# Patient Record
Sex: FEMALE | Race: WHITE | NOT HISPANIC OR LATINO | URBAN - METROPOLITAN AREA
[De-identification: names, ages, dates, MRNs, and addresses within clinical notes are randomized per-mention and may not be internally consistent; named-entity substitution may affect disease eponyms.]

---

## 2018-05-26 PROBLEM — Z00.00 ENCOUNTER FOR PREVENTIVE HEALTH EXAMINATION: Status: ACTIVE | Noted: 2018-05-26

## 2018-06-25 ENCOUNTER — OUTPATIENT (OUTPATIENT)
Dept: OUTPATIENT SERVICES | Facility: HOSPITAL | Age: 58
LOS: 1 days | Discharge: HOME | End: 2018-06-25

## 2018-06-25 ENCOUNTER — APPOINTMENT (OUTPATIENT)
Dept: PODIATRY | Facility: CLINIC | Age: 58
End: 2018-06-25
Payer: MEDICAID

## 2018-06-25 VITALS
WEIGHT: 186 LBS | BODY MASS INDEX: 28.19 KG/M2 | HEART RATE: 69 BPM | HEIGHT: 68 IN | SYSTOLIC BLOOD PRESSURE: 112 MMHG | DIASTOLIC BLOOD PRESSURE: 72 MMHG

## 2018-06-25 DIAGNOSIS — F17.210 NICOTINE DEPENDENCE, CIGARETTES, UNCOMPLICATED: ICD-10-CM

## 2018-06-25 DIAGNOSIS — K57.30 DIVERTICULOSIS OF LARGE INTESTINE W/OUT PERFORATION OR ABSCESS W/OUT BLEEDING: ICD-10-CM

## 2018-06-25 DIAGNOSIS — Z87.19 PERSONAL HISTORY OF OTHER DISEASES OF THE DIGESTIVE SYSTEM: ICD-10-CM

## 2018-06-25 PROCEDURE — 99203 OFFICE O/P NEW LOW 30 MIN: CPT | Mod: NC

## 2018-06-28 DIAGNOSIS — M20.41 OTHER HAMMER TOE(S) (ACQUIRED), RIGHT FOOT: ICD-10-CM

## 2018-06-28 DIAGNOSIS — Q82.9 CONGENITAL MALFORMATION OF SKIN, UNSPECIFIED: ICD-10-CM

## 2018-06-28 DIAGNOSIS — M21.612 BUNION OF LEFT FOOT: ICD-10-CM

## 2018-06-28 DIAGNOSIS — M20.42 OTHER HAMMER TOE(S) (ACQUIRED), LEFT FOOT: ICD-10-CM

## 2018-06-28 DIAGNOSIS — M21.611 BUNION OF RIGHT FOOT: ICD-10-CM

## 2018-09-18 ENCOUNTER — OUTPATIENT (OUTPATIENT)
Dept: OUTPATIENT SERVICES | Facility: HOSPITAL | Age: 58
LOS: 1 days | Discharge: HOME | End: 2018-09-18

## 2018-09-20 ENCOUNTER — EMERGENCY (EMERGENCY)
Facility: HOSPITAL | Age: 58
LOS: 0 days | Discharge: HOME | End: 2018-09-20
Admitting: EMERGENCY MEDICINE

## 2018-09-20 VITALS
SYSTOLIC BLOOD PRESSURE: 132 MMHG | TEMPERATURE: 96 F | RESPIRATION RATE: 18 BRPM | DIASTOLIC BLOOD PRESSURE: 65 MMHG | OXYGEN SATURATION: 97 % | HEART RATE: 69 BPM

## 2018-09-20 VITALS — WEIGHT: 179.9 LBS | HEIGHT: 68 IN

## 2018-09-20 DIAGNOSIS — K08.89 OTHER SPECIFIED DISORDERS OF TEETH AND SUPPORTING STRUCTURES: ICD-10-CM

## 2018-09-20 NOTE — ED ADULT NURSE NOTE - NSIMPLEMENTINTERV_GEN_ALL_ED
Implemented All Universal Safety Interventions:  [place] to call system. Call bell, personal items and telephone within reach. Instruct patient to call for assistance. Room bathroom lighting operational. Non-slip footwear when patient is off stretcher. Physically safe environment: no spills, clutter or unnecessary equipment. Stretcher in lowest position, wheels locked, appropriate side rails in place.

## 2018-09-20 NOTE — ED PROVIDER NOTE - OBJECTIVE STATEMENT
57 y/o F, no significant PMHx, presents to the ED with complaints of left lower dentalgia x two weeks. Patient admits to pain along her left lower gums without associated facial swelling, odynophagia, dysphagia, fever, chills, chest pain and dyspnea. She was in the dental clinic last week she states for similar pain along her left upper gums for which she was given a mouthwash. She has an 'emergency appt' with an outside dentist today however states that she prefers to see the dentist in the dental clinic this AM.

## 2018-09-20 NOTE — ED PROVIDER NOTE - ENMT, MLM
Airway patent, Nasal mucosa clear. Mouth with normal mucosa. Throat has no vesicles, no oropharyngeal exudates and uvula is midline.  + tenderness along tooth # 19 without surrounding gum fluctuance

## 2018-09-24 ENCOUNTER — APPOINTMENT (OUTPATIENT)
Dept: PODIATRY | Facility: CLINIC | Age: 58
End: 2018-09-24

## 2018-10-30 ENCOUNTER — OUTPATIENT (OUTPATIENT)
Dept: OUTPATIENT SERVICES | Facility: HOSPITAL | Age: 58
LOS: 1 days | Discharge: HOME | End: 2018-10-30

## 2018-11-09 ENCOUNTER — OUTPATIENT (OUTPATIENT)
Dept: OUTPATIENT SERVICES | Facility: HOSPITAL | Age: 58
LOS: 1 days | Discharge: HOME | End: 2018-11-09

## 2018-11-19 ENCOUNTER — APPOINTMENT (OUTPATIENT)
Dept: PODIATRY | Facility: CLINIC | Age: 58
End: 2018-11-19
Payer: MEDICAID

## 2018-11-19 ENCOUNTER — OUTPATIENT (OUTPATIENT)
Dept: OUTPATIENT SERVICES | Facility: HOSPITAL | Age: 58
LOS: 1 days | Discharge: HOME | End: 2018-11-19

## 2018-11-19 VITALS
WEIGHT: 190 LBS | DIASTOLIC BLOOD PRESSURE: 73 MMHG | HEART RATE: 79 BPM | BODY MASS INDEX: 28.79 KG/M2 | HEIGHT: 68 IN | SYSTOLIC BLOOD PRESSURE: 115 MMHG

## 2018-11-19 PROCEDURE — 29550 STRAPPING OF TOES: CPT | Mod: NC

## 2018-11-19 PROCEDURE — 99212 OFFICE O/P EST SF 10 MIN: CPT | Mod: NC,25

## 2018-11-21 ENCOUNTER — APPOINTMENT (OUTPATIENT)
Dept: PODIATRY | Facility: CLINIC | Age: 58
End: 2018-11-21

## 2018-11-21 DIAGNOSIS — M20.41 OTHER HAMMER TOE(S) (ACQUIRED), RIGHT FOOT: ICD-10-CM

## 2018-11-21 DIAGNOSIS — M21.611 BUNION OF RIGHT FOOT: ICD-10-CM

## 2018-11-21 DIAGNOSIS — Q82.9 CONGENITAL MALFORMATION OF SKIN, UNSPECIFIED: ICD-10-CM

## 2018-12-28 ENCOUNTER — OUTPATIENT (OUTPATIENT)
Dept: OUTPATIENT SERVICES | Facility: HOSPITAL | Age: 58
LOS: 1 days | Discharge: HOME | End: 2018-12-28

## 2018-12-31 DIAGNOSIS — K02.52 DENTAL CARIES ON PIT AND FISSURE SURFACE PENETRATING INTO DENTIN: ICD-10-CM

## 2019-02-08 ENCOUNTER — OUTPATIENT (OUTPATIENT)
Dept: OUTPATIENT SERVICES | Facility: HOSPITAL | Age: 59
LOS: 1 days | Discharge: HOME | End: 2019-02-08

## 2019-02-11 ENCOUNTER — APPOINTMENT (OUTPATIENT)
Dept: PODIATRY | Facility: CLINIC | Age: 59
End: 2019-02-11
Payer: MEDICAID

## 2019-02-11 ENCOUNTER — OUTPATIENT (OUTPATIENT)
Dept: OUTPATIENT SERVICES | Facility: HOSPITAL | Age: 59
LOS: 1 days | Discharge: HOME | End: 2019-02-11

## 2019-02-11 VITALS
BODY MASS INDEX: 28.04 KG/M2 | SYSTOLIC BLOOD PRESSURE: 119 MMHG | WEIGHT: 185 LBS | HEIGHT: 68 IN | HEART RATE: 67 BPM | DIASTOLIC BLOOD PRESSURE: 73 MMHG

## 2019-02-11 PROCEDURE — 99213 OFFICE O/P EST LOW 20 MIN: CPT | Mod: NC

## 2019-02-11 NOTE — PHYSICAL EXAM
[General Appearance - Alert] : alert [Full Pulse] : the pedal pulses are present [FreeTextEntry1] : IPK lesions submetatarsal head 2 and distal digital tuft (left 3rd digit),

## 2019-02-11 NOTE — ASSESSMENT
[FreeTextEntry1] : -I have explained to the patient that due to her  smoking history she at increased risk for non-healing of tissue and bone. Patient states that she is in chronic pain and wishes to consider surgery. Pt referred for DAISY/PVR and vascular surgery evaluation. \par -Aseptic debridement of porokeratotic tissue with #15 blade \par -PTR 3 months. WIll further discuss patients candidacy for surgery, following vascular studies. \par

## 2019-02-11 NOTE — HISTORY OF PRESENT ILLNESS
[FreeTextEntry1] : 59 y.o female returns today for treatment of pain on plantar aspect of the left foot. Patient h/o a bilateral bunion and hammer toe deformity with dorsal subluxation of the left 2nd digit at the MPTJ joint. Patient has a plantar porokeratotic lesions as a result of digital deformity. Pt reports minimal relief with removal padding dispensed on last visit. Patient also tried modification of shoes, which has not relieved patients symptoms.

## 2019-02-12 DIAGNOSIS — K02.62 DENTAL CARIES ON SMOOTH SURFACE PENETRATING INTO DENTIN: ICD-10-CM

## 2019-02-26 DIAGNOSIS — Q82.9 CONGENITAL MALFORMATION OF SKIN, UNSPECIFIED: ICD-10-CM

## 2019-02-26 DIAGNOSIS — M21.611 BUNION OF RIGHT FOOT: ICD-10-CM

## 2019-02-26 DIAGNOSIS — M20.41 OTHER HAMMER TOE(S) (ACQUIRED), RIGHT FOOT: ICD-10-CM

## 2019-02-27 ENCOUNTER — OUTPATIENT (OUTPATIENT)
Dept: OUTPATIENT SERVICES | Facility: HOSPITAL | Age: 59
LOS: 1 days | Discharge: HOME | End: 2019-02-27

## 2019-03-15 ENCOUNTER — OUTPATIENT (OUTPATIENT)
Dept: OUTPATIENT SERVICES | Facility: HOSPITAL | Age: 59
LOS: 1 days | Discharge: HOME | End: 2019-03-15

## 2019-03-22 ENCOUNTER — OUTPATIENT (OUTPATIENT)
Dept: OUTPATIENT SERVICES | Facility: HOSPITAL | Age: 59
LOS: 1 days | Discharge: HOME | End: 2019-03-22

## 2019-03-27 ENCOUNTER — APPOINTMENT (OUTPATIENT)
Dept: VASCULAR SURGERY | Facility: CLINIC | Age: 59
End: 2019-03-27
Payer: MEDICAID

## 2019-03-27 ENCOUNTER — OUTPATIENT (OUTPATIENT)
Dept: OUTPATIENT SERVICES | Facility: HOSPITAL | Age: 59
LOS: 1 days | Discharge: HOME | End: 2019-03-27

## 2019-03-27 VITALS
SYSTOLIC BLOOD PRESSURE: 110 MMHG | WEIGHT: 185 LBS | DIASTOLIC BLOOD PRESSURE: 70 MMHG | BODY MASS INDEX: 28.04 KG/M2 | HEIGHT: 68 IN

## 2019-03-27 DIAGNOSIS — I70.213 ATHEROSCLEROSIS OF NATIVE ARTERIES OF EXTREMITIES WITH INTERMITTENT CLAUDICATION, BILATERAL LEGS: ICD-10-CM

## 2019-03-27 PROCEDURE — 93925 LOWER EXTREMITY STUDY: CPT

## 2019-03-27 PROCEDURE — 99203 OFFICE O/P NEW LOW 30 MIN: CPT

## 2019-04-05 ENCOUNTER — OUTPATIENT (OUTPATIENT)
Dept: OUTPATIENT SERVICES | Facility: HOSPITAL | Age: 59
LOS: 1 days | Discharge: HOME | End: 2019-04-05

## 2019-04-05 DIAGNOSIS — K02.9 DENTAL CARIES, UNSPECIFIED: ICD-10-CM

## 2019-04-08 ENCOUNTER — OUTPATIENT (OUTPATIENT)
Dept: OUTPATIENT SERVICES | Facility: HOSPITAL | Age: 59
LOS: 1 days | Discharge: HOME | End: 2019-04-08

## 2019-04-10 ENCOUNTER — OUTPATIENT (OUTPATIENT)
Dept: OUTPATIENT SERVICES | Facility: HOSPITAL | Age: 59
LOS: 1 days | Discharge: HOME | End: 2019-04-10

## 2019-04-15 ENCOUNTER — OUTPATIENT (OUTPATIENT)
Dept: OUTPATIENT SERVICES | Facility: HOSPITAL | Age: 59
LOS: 1 days | Discharge: HOME | End: 2019-04-15

## 2019-05-03 ENCOUNTER — OUTPATIENT (OUTPATIENT)
Dept: OUTPATIENT SERVICES | Facility: HOSPITAL | Age: 59
LOS: 1 days | Discharge: HOME | End: 2019-05-03

## 2019-05-06 ENCOUNTER — APPOINTMENT (OUTPATIENT)
Dept: PODIATRY | Facility: CLINIC | Age: 59
End: 2019-05-06
Payer: MEDICAID

## 2019-05-06 ENCOUNTER — OUTPATIENT (OUTPATIENT)
Dept: OUTPATIENT SERVICES | Facility: HOSPITAL | Age: 59
LOS: 1 days | Discharge: HOME | End: 2019-05-06

## 2019-05-06 VITALS
WEIGHT: 185 LBS | BODY MASS INDEX: 28.04 KG/M2 | HEIGHT: 68 IN | DIASTOLIC BLOOD PRESSURE: 76 MMHG | HEART RATE: 78 BPM | SYSTOLIC BLOOD PRESSURE: 118 MMHG

## 2019-05-06 PROCEDURE — 99213 OFFICE O/P EST LOW 20 MIN: CPT | Mod: NC

## 2019-05-06 NOTE — REVIEW OF SYSTEMS
[As Noted in HPI] : as noted in HPI [Skin Lesions] : skin lesion [Negative] : Constitutional [de-identified] : painful porokeratotic lesion submetatarsal head 2 and distal digital tuft of the 2nd digit.

## 2019-05-06 NOTE — PHYSICAL EXAM
[Full Pulse] : the pedal pulses are present [General Appearance - Alert] : alert [FreeTextEntry1] : IPK lesions submetatarsal head 2 and distal digital tuft (left 3rd digit),

## 2019-05-06 NOTE — HISTORY OF PRESENT ILLNESS
[FreeTextEntry1] : 59 y.o female returns today for treatment of pain on plantar aspect of the left foot. Patient h/o a bilateral bunion and hammer toe deformity with dorsal subluxation of the left 2nd digit at the MPTJ joint. Patient has a plantar porokeratotic lesions as a result of digital deformity. Pt reports minimal relief with removal padding dispensed on last visit. Patient also tried modification of shoes, which has not relieved patients symptoms. Today, patient wishes to discuss surgical management of deformity. Pt states that digital deformity has become progressively worse and contributes to significant pain during ambulation.

## 2019-05-06 NOTE — ASSESSMENT
[FreeTextEntry1] : -DAISY/PVR reviewed, along with vascular note\par -PAC's could not access to review most recent xrays. Will sent for repeat xrays if needed\par -Discussed surgical management of bunion and digital deformities. questions were sought and answered\par -pt planned for surgery at the end of june early july for left bunionectomy, possible akin, 2nd weil with arthroplasty, 3rd digit arthroplasty and 4th flexor tenotomy\par -return in 6 weeks

## 2019-05-09 DIAGNOSIS — M21.612 BUNION OF LEFT FOOT: ICD-10-CM

## 2019-05-09 DIAGNOSIS — M20.42 OTHER HAMMER TOE(S) (ACQUIRED), LEFT FOOT: ICD-10-CM

## 2019-05-09 DIAGNOSIS — Q82.9 CONGENITAL MALFORMATION OF SKIN, UNSPECIFIED: ICD-10-CM

## 2019-05-10 ENCOUNTER — OUTPATIENT (OUTPATIENT)
Dept: OUTPATIENT SERVICES | Facility: HOSPITAL | Age: 59
LOS: 1 days | Discharge: HOME | End: 2019-05-10

## 2019-05-17 ENCOUNTER — OUTPATIENT (OUTPATIENT)
Dept: OUTPATIENT SERVICES | Facility: HOSPITAL | Age: 59
LOS: 1 days | Discharge: HOME | End: 2019-05-17

## 2019-05-17 DIAGNOSIS — K02.52 DENTAL CARIES ON PIT AND FISSURE SURFACE PENETRATING INTO DENTIN: ICD-10-CM

## 2019-05-24 ENCOUNTER — OUTPATIENT (OUTPATIENT)
Dept: OUTPATIENT SERVICES | Facility: HOSPITAL | Age: 59
LOS: 1 days | Discharge: HOME | End: 2019-05-24

## 2019-05-31 ENCOUNTER — OUTPATIENT (OUTPATIENT)
Dept: OUTPATIENT SERVICES | Facility: HOSPITAL | Age: 59
LOS: 1 days | Discharge: HOME | End: 2019-05-31

## 2019-06-07 ENCOUNTER — OUTPATIENT (OUTPATIENT)
Dept: OUTPATIENT SERVICES | Facility: HOSPITAL | Age: 59
LOS: 1 days | Discharge: HOME | End: 2019-06-07

## 2019-06-10 ENCOUNTER — OUTPATIENT (OUTPATIENT)
Dept: OUTPATIENT SERVICES | Facility: HOSPITAL | Age: 59
LOS: 1 days | Discharge: HOME | End: 2019-06-10

## 2019-06-10 DIAGNOSIS — K02.9 DENTAL CARIES, UNSPECIFIED: ICD-10-CM

## 2019-06-10 DIAGNOSIS — Z02.9 ENCOUNTER FOR ADMINISTRATIVE EXAMINATIONS, UNSPECIFIED: ICD-10-CM

## 2019-06-17 ENCOUNTER — OUTPATIENT (OUTPATIENT)
Dept: OUTPATIENT SERVICES | Facility: HOSPITAL | Age: 59
LOS: 1 days | Discharge: HOME | End: 2019-06-17

## 2019-06-17 DIAGNOSIS — Z01.21 ENCOUNTER FOR DENTAL EXAMINATION AND CLEANING WITH ABNORMAL FINDINGS: ICD-10-CM

## 2019-06-24 ENCOUNTER — APPOINTMENT (OUTPATIENT)
Dept: PODIATRY | Facility: CLINIC | Age: 59
End: 2019-06-24

## 2019-07-15 DIAGNOSIS — K05.10 CHRONIC GINGIVITIS, PLAQUE INDUCED: ICD-10-CM

## 2019-09-17 ENCOUNTER — RESULT REVIEW (OUTPATIENT)
Age: 59
End: 2019-09-17

## 2019-09-17 ENCOUNTER — TRANSCRIPTION ENCOUNTER (OUTPATIENT)
Age: 59
End: 2019-09-17

## 2019-09-17 ENCOUNTER — OUTPATIENT (OUTPATIENT)
Dept: OUTPATIENT SERVICES | Facility: HOSPITAL | Age: 59
LOS: 1 days | Discharge: HOME | End: 2019-09-17
Payer: MEDICAID

## 2019-09-17 VITALS
RESPIRATION RATE: 16 BRPM | WEIGHT: 184.09 LBS | TEMPERATURE: 98 F | DIASTOLIC BLOOD PRESSURE: 85 MMHG | HEIGHT: 68 IN | HEART RATE: 91 BPM | SYSTOLIC BLOOD PRESSURE: 105 MMHG

## 2019-09-17 VITALS — DIASTOLIC BLOOD PRESSURE: 61 MMHG | SYSTOLIC BLOOD PRESSURE: 99 MMHG | HEART RATE: 76 BPM | RESPIRATION RATE: 18 BRPM

## 2019-09-17 DIAGNOSIS — Z98.82 BREAST IMPLANT STATUS: Chronic | ICD-10-CM

## 2019-09-17 PROCEDURE — 88305 TISSUE EXAM BY PATHOLOGIST: CPT | Mod: 26

## 2019-09-17 RX ORDER — HYDROXYZINE HCL 10 MG
1 TABLET ORAL
Qty: 0 | Refills: 0 | DISCHARGE

## 2019-09-17 NOTE — ASU DISCHARGE PLAN (ADULT/PEDIATRIC) - CALL YOUR DOCTOR IF YOU HAVE ANY OF THE FOLLOWING:
Fever greater than (need to indicate Fahrenheit or Celsius)/Excessive diarrhea/Bleeding that does not stop/Pain not relieved by Medications/Numbness, tingling, color or temperature change to extremity/Increased irritability or sluggishness/Wound/Surgical Site with redness, or foul smelling discharge or pus/Nausea and vomiting that does not stop/Unable to urinate/Swelling that gets worse/Inability to tolerate liquids or foods

## 2019-09-17 NOTE — ASU DISCHARGE PLAN (ADULT/PEDIATRIC) - CARE PROVIDER_API CALL
Flakito Hughes (DO)  Gastroenterology; Internal Medicine  [address] [place] [address]  Phone: [phone]  Fax: [phone]  Follow Up Time:

## 2019-09-18 LAB — SURGICAL PATHOLOGY STUDY: SIGNIFICANT CHANGE UP

## 2019-09-23 DIAGNOSIS — K57.30 DIVERTICULOSIS OF LARGE INTESTINE WITHOUT PERFORATION OR ABSCESS WITHOUT BLEEDING: ICD-10-CM

## 2019-09-23 DIAGNOSIS — E78.5 HYPERLIPIDEMIA, UNSPECIFIED: ICD-10-CM

## 2019-09-23 DIAGNOSIS — K64.9 UNSPECIFIED HEMORRHOIDS: ICD-10-CM

## 2019-09-23 DIAGNOSIS — R19.5 OTHER FECAL ABNORMALITIES: ICD-10-CM

## 2019-09-23 DIAGNOSIS — Z86.010 PERSONAL HISTORY OF COLONIC POLYPS: ICD-10-CM

## 2019-09-23 DIAGNOSIS — D12.5 BENIGN NEOPLASM OF SIGMOID COLON: ICD-10-CM

## 2019-10-07 ENCOUNTER — OUTPATIENT (OUTPATIENT)
Dept: OUTPATIENT SERVICES | Facility: HOSPITAL | Age: 59
LOS: 1 days | Discharge: HOME | End: 2019-10-07

## 2019-10-07 DIAGNOSIS — Z98.82 BREAST IMPLANT STATUS: Chronic | ICD-10-CM

## 2019-10-07 PROBLEM — E78.5 HYPERLIPIDEMIA, UNSPECIFIED: Chronic | Status: ACTIVE | Noted: 2019-09-17

## 2019-10-21 ENCOUNTER — OUTPATIENT (OUTPATIENT)
Dept: OUTPATIENT SERVICES | Facility: HOSPITAL | Age: 59
LOS: 1 days | Discharge: HOME | End: 2019-10-21

## 2019-10-21 ENCOUNTER — APPOINTMENT (OUTPATIENT)
Dept: PODIATRY | Facility: CLINIC | Age: 59
End: 2019-10-21
Payer: MEDICAID

## 2019-10-21 VITALS
WEIGHT: 185 LBS | BODY MASS INDEX: 28.04 KG/M2 | SYSTOLIC BLOOD PRESSURE: 115 MMHG | DIASTOLIC BLOOD PRESSURE: 68 MMHG | HEIGHT: 68 IN | HEART RATE: 84 BPM

## 2019-10-21 DIAGNOSIS — Q82.9 CONGENITAL MALFORMATION OF SKIN, UNSPECIFIED: ICD-10-CM

## 2019-10-21 DIAGNOSIS — M20.42 OTHER HAMMER TOE(S) (ACQUIRED), LEFT FOOT: ICD-10-CM

## 2019-10-21 DIAGNOSIS — M77.42 METATARSALGIA, LEFT FOOT: ICD-10-CM

## 2019-10-21 DIAGNOSIS — M21.612 BUNION OF LEFT FOOT: ICD-10-CM

## 2019-10-21 DIAGNOSIS — Z98.82 BREAST IMPLANT STATUS: Chronic | ICD-10-CM

## 2019-10-21 PROCEDURE — 99212 OFFICE O/P EST SF 10 MIN: CPT | Mod: NC

## 2019-10-22 NOTE — HISTORY OF PRESENT ILLNESS
[FreeTextEntry1] : 59 y.o female is here today for treatment of pain on plantar aspect of the left foot. Patient h/o a bilateral bunion and hammer toe deformity with dorsal subluxation of the left 2nd digit at the MPTJ joint. Patient has a plantar porokeratotic lesions as a result of digital deformity.

## 2019-10-22 NOTE — ASSESSMENT
[FreeTextEntry1] : -Patient wishes to hold of on surgery for now (unable to take time off from work). \par -Aseptic debridement of porokeratotic tissue with #15 blade \par -pt would benefit from custom molded orthotics to off load asa prominences. \par -PTR 3 months\par

## 2019-10-22 NOTE — REVIEW OF SYSTEMS
[Skin Lesions] : skin lesion [As Noted in HPI] : as noted in HPI [Negative] : Constitutional [de-identified] : painful porokeratotic lesion submetatarsal head 2 and distal digital tuft of the 2nd digit.

## 2019-11-04 ENCOUNTER — APPOINTMENT (OUTPATIENT)
Dept: PODIATRY | Facility: CLINIC | Age: 59
End: 2019-11-04
Payer: MEDICAID

## 2019-11-04 VITALS
DIASTOLIC BLOOD PRESSURE: 72 MMHG | BODY MASS INDEX: 27.43 KG/M2 | TEMPERATURE: 97.5 F | HEART RATE: 79 BPM | WEIGHT: 181 LBS | SYSTOLIC BLOOD PRESSURE: 107 MMHG | HEIGHT: 68 IN

## 2019-11-04 PROCEDURE — 99213 OFFICE O/P EST LOW 20 MIN: CPT

## 2019-11-08 NOTE — HISTORY OF PRESENT ILLNESS
[FreeTextEntry1] : 59 y.o female is here today for treatment of pain on plantar aspect of the left foot. Patient h/o a bilateral bunion and hammer toe deformity with dorsal subluxation of the left 2nd digit at the MPTJ joint. Patient has a plantar porokeratotic lesions as a result of digital deformity. presents today for casting for orthotics

## 2019-11-08 NOTE — ASSESSMENT
[FreeTextEntry1] : -Patient was placed in a sitting position. Appropriate plastic covers were placed on the patients feet. An STS plaster cast was then placed over the patients feet. Any wrinkling in the cast was removed. The foot was then placed in subtalar joint neutral. Weightbearing was simulated by pushing up on the lateral column and then first metatarsal phalangeal joint was hyperextended to create a medial longitudinal arch\par \par -Orthotic Rx: graphite flex with full foot extension, neutral posting, spenco top cover with balancing for lesions \par -Patient will return in 3 weeks to  orthotics\par

## 2019-11-08 NOTE — PHYSICAL EXAM
[General Appearance - Alert] : alert [General Appearance - In No Acute Distress] : in no acute distress [Full Pulse] : the pedal pulses are present [Oriented To Time, Place, And Person] : oriented to person, place, and time [Impaired Insight] : insight and judgment were intact [FreeTextEntry1] : IPK lesions submetatarsal head 2 and distal digital tuft (left 3rd digit),

## 2019-11-26 ENCOUNTER — APPOINTMENT (OUTPATIENT)
Dept: PODIATRY | Facility: CLINIC | Age: 59
End: 2019-11-26
Payer: MEDICAID

## 2019-11-26 ENCOUNTER — OUTPATIENT (OUTPATIENT)
Dept: OUTPATIENT SERVICES | Facility: HOSPITAL | Age: 59
LOS: 1 days | Discharge: HOME | End: 2019-11-26

## 2019-11-26 VITALS
HEART RATE: 94 BPM | BODY MASS INDEX: 27.43 KG/M2 | WEIGHT: 181 LBS | SYSTOLIC BLOOD PRESSURE: 124 MMHG | HEIGHT: 68 IN | DIASTOLIC BLOOD PRESSURE: 79 MMHG

## 2019-11-26 DIAGNOSIS — Z98.82 BREAST IMPLANT STATUS: Chronic | ICD-10-CM

## 2019-11-26 PROCEDURE — 99212 OFFICE O/P EST SF 10 MIN: CPT | Mod: NC

## 2019-11-27 DIAGNOSIS — Q82.9 CONGENITAL MALFORMATION OF SKIN, UNSPECIFIED: ICD-10-CM

## 2019-11-27 DIAGNOSIS — M20.42 OTHER HAMMER TOE(S) (ACQUIRED), LEFT FOOT: ICD-10-CM

## 2019-11-27 DIAGNOSIS — M77.42 METATARSALGIA, LEFT FOOT: ICD-10-CM

## 2019-11-27 NOTE — ASSESSMENT
[FreeTextEntry1] : Orthotics dispensed to patient. Patient was fitted for accurate fit and placed in a gait cycle. no adjustments were made. \par Instructed to gradually increase orthotic use by 2-3 hours daily for the first week\par return 3 months\par \par

## 2019-11-27 NOTE — HISTORY OF PRESENT ILLNESS
[FreeTextEntry1] : 59 y.o female is here today for treatment of pain on plantar aspect of the left foot. Patient h/o a bilateral bunion and hammer toe deformity with dorsal subluxation of the left 2nd digit at the MPTJ joint. Patient has a plantar porokeratotic lesions as a result of digital deformity. presents today for dispensing  orthotics

## 2019-12-11 ENCOUNTER — OUTPATIENT (OUTPATIENT)
Dept: OUTPATIENT SERVICES | Facility: HOSPITAL | Age: 59
LOS: 1 days | Discharge: HOME | End: 2019-12-11

## 2019-12-11 DIAGNOSIS — Z98.82 BREAST IMPLANT STATUS: Chronic | ICD-10-CM

## 2020-01-10 ENCOUNTER — OUTPATIENT (OUTPATIENT)
Dept: OUTPATIENT SERVICES | Facility: HOSPITAL | Age: 60
LOS: 1 days | Discharge: HOME | End: 2020-01-10

## 2020-01-10 DIAGNOSIS — Z98.82 BREAST IMPLANT STATUS: Chronic | ICD-10-CM

## 2020-01-13 ENCOUNTER — APPOINTMENT (OUTPATIENT)
Dept: PODIATRY | Facility: CLINIC | Age: 60
End: 2020-01-13

## 2020-01-14 DIAGNOSIS — K02.9 DENTAL CARIES, UNSPECIFIED: ICD-10-CM

## 2020-02-18 ENCOUNTER — APPOINTMENT (OUTPATIENT)
Dept: PODIATRY | Facility: CLINIC | Age: 60
End: 2020-02-18

## 2020-02-21 ENCOUNTER — OUTPATIENT (OUTPATIENT)
Dept: OUTPATIENT SERVICES | Facility: HOSPITAL | Age: 60
LOS: 1 days | Discharge: HOME | End: 2020-02-21

## 2020-02-21 DIAGNOSIS — Z98.82 BREAST IMPLANT STATUS: Chronic | ICD-10-CM

## 2020-02-28 ENCOUNTER — OUTPATIENT (OUTPATIENT)
Dept: OUTPATIENT SERVICES | Facility: HOSPITAL | Age: 60
LOS: 1 days | Discharge: HOME | End: 2020-02-28

## 2020-02-28 DIAGNOSIS — Z98.82 BREAST IMPLANT STATUS: Chronic | ICD-10-CM

## 2020-03-25 ENCOUNTER — TRANSCRIPTION ENCOUNTER (OUTPATIENT)
Age: 60
End: 2020-03-25

## 2020-07-06 ENCOUNTER — APPOINTMENT (OUTPATIENT)
Dept: PODIATRY | Facility: CLINIC | Age: 60
End: 2020-07-06
Payer: MEDICAID

## 2020-07-06 ENCOUNTER — OUTPATIENT (OUTPATIENT)
Dept: OUTPATIENT SERVICES | Facility: HOSPITAL | Age: 60
LOS: 1 days | Discharge: HOME | End: 2020-07-06

## 2020-07-06 DIAGNOSIS — Z98.82 BREAST IMPLANT STATUS: Chronic | ICD-10-CM

## 2020-07-06 PROCEDURE — 99212 OFFICE O/P EST SF 10 MIN: CPT | Mod: NC

## 2020-07-07 NOTE — HISTORY OF PRESENT ILLNESS
[FreeTextEntry1] : 60 y.o female is here today for treatment of pain on plantar aspect of the left foot. Patient h/o a bilateral bunion and hammer toe deformity with dorsal subluxation of the left 2nd digit at the MPTJ joint. Patient today for debridement of painful lesions

## 2020-07-08 DIAGNOSIS — Q82.9 CONGENITAL MALFORMATION OF SKIN, UNSPECIFIED: ICD-10-CM

## 2020-07-08 DIAGNOSIS — M79.672 PAIN IN LEFT FOOT: ICD-10-CM

## 2020-07-08 DIAGNOSIS — M77.42 METATARSALGIA, LEFT FOOT: ICD-10-CM

## 2020-07-15 ENCOUNTER — OUTPATIENT (OUTPATIENT)
Dept: OUTPATIENT SERVICES | Facility: HOSPITAL | Age: 60
LOS: 1 days | Discharge: HOME | End: 2020-07-15

## 2020-07-15 DIAGNOSIS — Z98.82 BREAST IMPLANT STATUS: Chronic | ICD-10-CM

## 2020-07-16 DIAGNOSIS — K02.53 DENTAL CARIES ON PIT AND FISSURE SURFACE PENETRATING INTO PULP: ICD-10-CM

## 2020-08-01 ENCOUNTER — EMERGENCY (EMERGENCY)
Facility: HOSPITAL | Age: 60
LOS: 0 days | Discharge: HOME | End: 2020-08-01
Attending: EMERGENCY MEDICINE | Admitting: EMERGENCY MEDICINE
Payer: MEDICAID

## 2020-08-01 VITALS
TEMPERATURE: 97 F | OXYGEN SATURATION: 98 % | RESPIRATION RATE: 18 BRPM | DIASTOLIC BLOOD PRESSURE: 72 MMHG | SYSTOLIC BLOOD PRESSURE: 140 MMHG | HEART RATE: 74 BPM

## 2020-08-01 VITALS — WEIGHT: 179.9 LBS

## 2020-08-01 DIAGNOSIS — L03.116 CELLULITIS OF LEFT LOWER LIMB: ICD-10-CM

## 2020-08-01 DIAGNOSIS — Z98.82 BREAST IMPLANT STATUS: Chronic | ICD-10-CM

## 2020-08-01 DIAGNOSIS — M79.669 PAIN IN UNSPECIFIED LOWER LEG: ICD-10-CM

## 2020-08-01 DIAGNOSIS — Z98.890 OTHER SPECIFIED POSTPROCEDURAL STATES: ICD-10-CM

## 2020-08-01 PROCEDURE — 99284 EMERGENCY DEPT VISIT MOD MDM: CPT

## 2020-08-01 PROCEDURE — 93970 EXTREMITY STUDY: CPT | Mod: 26

## 2020-08-01 PROCEDURE — 73620 X-RAY EXAM OF FOOT: CPT | Mod: 26,LT

## 2020-08-01 RX ORDER — ALBUTEROL 90 UG/1
2 AEROSOL, METERED ORAL
Qty: 0 | Refills: 0 | DISCHARGE

## 2020-08-01 RX ORDER — BUDESONIDE AND FORMOTEROL FUMARATE DIHYDRATE 160; 4.5 UG/1; UG/1
2 AEROSOL RESPIRATORY (INHALATION)
Qty: 0 | Refills: 0 | DISCHARGE

## 2020-08-01 RX ORDER — HYDROXYZINE HCL 10 MG
1 TABLET ORAL
Qty: 0 | Refills: 0 | DISCHARGE

## 2020-08-01 RX ORDER — PANTOPRAZOLE SODIUM 20 MG/1
1 TABLET, DELAYED RELEASE ORAL
Qty: 0 | Refills: 0 | DISCHARGE

## 2020-08-01 RX ORDER — CEPHALEXIN 500 MG
1 CAPSULE ORAL
Qty: 40 | Refills: 0
Start: 2020-08-01 | End: 2020-08-10

## 2020-08-01 NOTE — ED PROVIDER NOTE - CLINICAL SUMMARY MEDICAL DECISION MAKING FREE TEXT BOX
pt with small area of redness to dorsum of foot no trauma no leg pain or swelling no SOB no fever given rx for keflex bid and told to get duplex/xr, PE as above, imaging reviewed, will d/c to f/u with pmd. Patient counseled regarding conditions which should prompt return.

## 2020-08-01 NOTE — ED PROVIDER NOTE - OBJECTIVE STATEMENT
Patient sent from [facility] to R/o DVT. C/o right foot pain for 2 weeks, with redness, Pain radiates to lower leg, Mild swelling, no fever, Seen at [facility] today. No h/o DVT. no SOB, C/o of 1 episode of right side sharp pain in chest yesterday lasting only 1-2 seconds, None since.

## 2020-08-01 NOTE — ED ADULT TRIAGE NOTE - CHIEF COMPLAINT QUOTE
pt complaining of left leg swelling and pain that is worse in her foot and extends up to her knee, denies injury, sent from [facility] for further eval. pt denies shortness of breath, but states that last night had a momentary episode of right sided chest pain that resolved.

## 2020-08-01 NOTE — ED ADULT NURSE NOTE - OBJECTIVE STATEMENT
pt complaining of left leg swelling and pain that is worse in her foot and extends up to her knee, denies injury, sent from [facility] for further eval. pt denies shortness of breath, but states that last night had a momentary episode of right sided chest pain that resolved.

## 2020-08-01 NOTE — ED ADULT NURSE NOTE - NSIMPLEMENTINTERV_GEN_ALL_ED
Implemented All Universal Safety Interventions:  [place] to call system. Call bell, personal items and telephone within reach. Instruct patient to call for assistance. Room bathroom lighting operational. Non-slip footwear when patient is off stretcher. Physically safe environment: no spills, clutter or unnecessary equipment. Stretcher in lowest position, wheels locked, appropriate side rails in place.

## 2020-08-01 NOTE — ED ADULT NURSE NOTE - CHIEF COMPLAINT QUOTE
pt complaining of left leg swelling and pain that is worse in her foot and extends up to her knee, denies injury, sent from [facility] for further eval. pt denies shortness of breath, but states that last night had a momentary episode of right sided chest pain that resolved.

## 2020-08-01 NOTE — ED PROVIDER NOTE - PATIENT PORTAL LINK FT
You can access the FollowMyHealth Patient Portal offered by [facility] by registering at the following website: [email or web address]. By joining [facility]’s FollowMyHealth portal, you will also be able to view your health information using other applications (apps) compatible with our system.

## 2020-08-26 ENCOUNTER — OUTPATIENT (OUTPATIENT)
Dept: OUTPATIENT SERVICES | Facility: HOSPITAL | Age: 60
LOS: 1 days | Discharge: HOME | End: 2020-08-26

## 2020-08-26 DIAGNOSIS — Z98.82 BREAST IMPLANT STATUS: Chronic | ICD-10-CM

## 2020-10-05 ENCOUNTER — APPOINTMENT (OUTPATIENT)
Dept: PODIATRY | Facility: CLINIC | Age: 60
End: 2020-10-05
Payer: MEDICAID

## 2020-10-05 ENCOUNTER — OUTPATIENT (OUTPATIENT)
Dept: OUTPATIENT SERVICES | Facility: HOSPITAL | Age: 60
LOS: 1 days | Discharge: HOME | End: 2020-10-05

## 2020-10-05 DIAGNOSIS — Z98.82 BREAST IMPLANT STATUS: Chronic | ICD-10-CM

## 2020-10-05 PROCEDURE — 99212 OFFICE O/P EST SF 10 MIN: CPT | Mod: NC

## 2020-10-05 NOTE — HISTORY OF PRESENT ILLNESS
[FreeTextEntry1] : 60 y.o female returns today for treatment of pain on plantar aspect of the left foot. Patient h/o a bilateral bunion and hammer toe deformity with dorsal subluxation of the left 2nd digit at the MPTJ joint. Patient today for debridement of painful lesions

## 2020-10-05 NOTE — PHYSICAL EXAM
[General Appearance - Alert] : alert [General Appearance - In No Acute Distress] : in no acute distress [Oriented To Time, Place, And Person] : oriented to person, place, and time [Impaired Insight] : insight and judgment were intact [de-identified] : b/l bunion and hammer toe deformities 2-5, dorsal subluxation of the of the left 2nd toe at MTPJ. \par  [FreeTextEntry1] : IPK lesions submetatarsal head 2 and distal digital tuft IPK lesions submetatarsal head 2 and distal digital tuft (left

## 2020-10-16 ENCOUNTER — OUTPATIENT (OUTPATIENT)
Dept: OUTPATIENT SERVICES | Facility: HOSPITAL | Age: 60
LOS: 1 days | Discharge: HOME | End: 2020-10-16

## 2020-10-16 DIAGNOSIS — Z98.82 BREAST IMPLANT STATUS: Chronic | ICD-10-CM

## 2020-10-19 DIAGNOSIS — M20.42 OTHER HAMMER TOE(S) (ACQUIRED), LEFT FOOT: ICD-10-CM

## 2020-10-19 DIAGNOSIS — Q82.9 CONGENITAL MALFORMATION OF SKIN, UNSPECIFIED: ICD-10-CM

## 2020-10-19 DIAGNOSIS — M79.672 PAIN IN LEFT FOOT: ICD-10-CM

## 2020-10-19 DIAGNOSIS — M77.42 METATARSALGIA, LEFT FOOT: ICD-10-CM

## 2020-10-19 DIAGNOSIS — M21.612 BUNION OF LEFT FOOT: ICD-10-CM

## 2020-10-25 ENCOUNTER — TRANSCRIPTION ENCOUNTER (OUTPATIENT)
Age: 60
End: 2020-10-25

## 2020-12-14 ENCOUNTER — OUTPATIENT (OUTPATIENT)
Dept: OUTPATIENT SERVICES | Facility: HOSPITAL | Age: 60
LOS: 1 days | Discharge: HOME | End: 2020-12-14

## 2020-12-14 DIAGNOSIS — Z98.82 BREAST IMPLANT STATUS: Chronic | ICD-10-CM

## 2021-01-11 ENCOUNTER — APPOINTMENT (OUTPATIENT)
Dept: PODIATRY | Facility: CLINIC | Age: 61
End: 2021-01-11
Payer: MEDICAID

## 2021-01-11 ENCOUNTER — OUTPATIENT (OUTPATIENT)
Dept: OUTPATIENT SERVICES | Facility: HOSPITAL | Age: 61
LOS: 1 days | Discharge: HOME | End: 2021-01-11

## 2021-01-11 DIAGNOSIS — M20.42 OTHER HAMMER TOE(S) (ACQUIRED), LEFT FOOT: ICD-10-CM

## 2021-01-11 DIAGNOSIS — Q82.9 CONGENITAL MALFORMATION OF SKIN, UNSPECIFIED: ICD-10-CM

## 2021-01-11 DIAGNOSIS — Z01.818 ENCOUNTER FOR OTHER PREPROCEDURAL EXAMINATION: ICD-10-CM

## 2021-01-11 DIAGNOSIS — M77.42 METATARSALGIA, LEFT FOOT: ICD-10-CM

## 2021-01-11 DIAGNOSIS — Z98.82 BREAST IMPLANT STATUS: Chronic | ICD-10-CM

## 2021-01-11 DIAGNOSIS — M21.612 BUNION OF LEFT FOOT: ICD-10-CM

## 2021-01-11 PROCEDURE — 99213 OFFICE O/P EST LOW 20 MIN: CPT | Mod: NC

## 2021-01-11 NOTE — END OF VISIT
[] : Resident [FreeTextEntry3] : patient interested in left bunion and hammer toe surgery. Discussed increased risk of surgery in the setting of long smoking history.  including non-healing wound, infection, non-union. Patients main region of pain is secondary to 2nd hammer toe w/ cocked up 2nd hammer toe. Discussed partial 2nd ray resection vs left bunion and 2nd hammer toe repair and 3rd flexor tenotomy. Pt will consider her options. \par retun in 6 weeks for surgery scheduling

## 2021-01-11 NOTE — ASSESSMENT
[FreeTextEntry1] : Bunion B/L L>R\par Hammertoes 2-5 B/L L>R\par Dislocation left 2nd MTPJ\par -Aseptic debridement of porokeratotic tissue with #15 blade b/l feet\par -Pt interested on surgical management of left foot deformity. Discussed surgical treatment. Risks and benefits.\par -Planing surgery April \par - RTO in 6 weeks for more dbx and further discussion of the surgery\par - Rx DAISY/PVR.

## 2021-01-11 NOTE — HISTORY OF PRESENT ILLNESS
[FreeTextEntry1] : Pain on plantar aspect of the left foot secondary ti calluses.  Patient h/o a bilateral bunion and hammer toe deformity with dorsal subluxation of the left 2nd digit at the MPTJ joint. Patient today for debridement of painful lcalluses B/L feet. Pt asking for surgical management of the left foot deformity. Denies n/f/v/c/d/sob.

## 2021-01-11 NOTE — PHYSICAL EXAM
[General Appearance - Alert] : alert [General Appearance - In No Acute Distress] : in no acute distress [Oriented To Time, Place, And Person] : oriented to person, place, and time [Impaired Insight] : insight and judgment were intact [de-identified] : b/l bunion and hammer toe deformities 2-5, dorsal subluxation of the of the left 2nd toe at MTPJ with callus formation at sub met 2 and porokeratosis at left 3rd digit tuft (flexible 3rd hammer toe)\par  [FreeTextEntry1] : IPK lesions submetatarsal head 2 and distal digital tuft IPK lesions submetatarsal head 2 and distal digital tuft (left

## 2021-02-23 ENCOUNTER — APPOINTMENT (OUTPATIENT)
Dept: PODIATRY | Facility: CLINIC | Age: 61
End: 2021-02-23

## 2021-05-14 ENCOUNTER — OUTPATIENT (OUTPATIENT)
Dept: OUTPATIENT SERVICES | Facility: HOSPITAL | Age: 61
LOS: 1 days | Discharge: HOME | End: 2021-05-14

## 2021-05-14 DIAGNOSIS — Z98.82 BREAST IMPLANT STATUS: Chronic | ICD-10-CM

## 2021-05-18 DIAGNOSIS — K02.52 DENTAL CARIES ON PIT AND FISSURE SURFACE PENETRATING INTO DENTIN: ICD-10-CM

## 2021-06-07 ENCOUNTER — OUTPATIENT (OUTPATIENT)
Dept: OUTPATIENT SERVICES | Facility: HOSPITAL | Age: 61
LOS: 1 days | Discharge: HOME | End: 2021-06-07

## 2021-06-07 ENCOUNTER — APPOINTMENT (OUTPATIENT)
Dept: PODIATRY | Facility: CLINIC | Age: 61
End: 2021-06-07
Payer: MEDICAID

## 2021-06-07 DIAGNOSIS — Z98.82 BREAST IMPLANT STATUS: Chronic | ICD-10-CM

## 2021-06-07 PROCEDURE — 99212 OFFICE O/P EST SF 10 MIN: CPT | Mod: NC

## 2021-06-08 NOTE — END OF VISIT
[] : Resident [FreeTextEntry3] : debridement of painful porokeratosis\par pt wishes to hold of on surgery for now

## 2021-06-08 NOTE — HISTORY OF PRESENT ILLNESS
[FreeTextEntry1] : Pain on plantar aspect of the left foot secondary to calluses.  Patient h/o a bilateral bunion and hammer toe deformity with dorsal subluxation of the left 2nd digit at the MPTJ joint. Patient today for debridement of painful calluses Left foot. Pt. denies any other pedal complaints at this time.

## 2021-06-08 NOTE — PHYSICAL EXAM
[General Appearance - Alert] : alert [General Appearance - In No Acute Distress] : in no acute distress [Oriented To Time, Place, And Person] : oriented to person, place, and time [Impaired Insight] : insight and judgment were intact [de-identified] : b/l bunion and hammer toe deformities 2-5, dorsal subluxation of the of the left 2nd toe at MTPJ with callus formation at sub met 2 and porokeratosis at left 3rd digit tuft (flexible 3rd hammer toe)\par  [FreeTextEntry1] : IPK lesions submetatarsal head 2 and distal digital tuft IPK lesions submetatarsal head 2 and distal digital tuft (left

## 2021-06-08 NOTE — ASSESSMENT
[FreeTextEntry1] : Bunion B/L L>R\par Hammertoes 2-5 B/L L>R\par Dislocation left 2nd MTPJ\par -Aseptic debridement of porokeratotic tissue with #15 blade b/l feet \par -Pt. RTC 2 months

## 2021-06-10 DIAGNOSIS — Q82.9 CONGENITAL MALFORMATION OF SKIN, UNSPECIFIED: ICD-10-CM

## 2021-06-10 DIAGNOSIS — M77.42 METATARSALGIA, LEFT FOOT: ICD-10-CM

## 2021-06-10 DIAGNOSIS — M21.612 BUNION OF LEFT FOOT: ICD-10-CM

## 2021-06-10 DIAGNOSIS — M79.672 PAIN IN LEFT FOOT: ICD-10-CM

## 2021-06-10 DIAGNOSIS — M20.42 OTHER HAMMER TOE(S) (ACQUIRED), LEFT FOOT: ICD-10-CM

## 2021-09-21 ENCOUNTER — OUTPATIENT (OUTPATIENT)
Dept: OUTPATIENT SERVICES | Facility: HOSPITAL | Age: 61
LOS: 1 days | Discharge: HOME | End: 2021-09-21

## 2021-09-21 ENCOUNTER — APPOINTMENT (OUTPATIENT)
Dept: PODIATRY | Facility: CLINIC | Age: 61
End: 2021-09-21
Payer: MEDICAID

## 2021-09-21 DIAGNOSIS — Z98.82 BREAST IMPLANT STATUS: Chronic | ICD-10-CM

## 2021-09-21 PROCEDURE — 99212 OFFICE O/P EST SF 10 MIN: CPT | Mod: NC

## 2021-09-21 NOTE — ASSESSMENT
[FreeTextEntry1] : Bunion B/L L>R\par Hammertoes 2-5 B/L L>R\par Dislocation left 2nd MTPJ\par -Aseptic debridement of porokeratotic tissue with #15 blade b/l feet \par -Pt. RTC as needed

## 2021-09-21 NOTE — PHYSICAL EXAM
[General Appearance - Alert] : alert [General Appearance - In No Acute Distress] : in no acute distress [Oriented To Time, Place, And Person] : oriented to person, place, and time [Impaired Insight] : insight and judgment were intact [de-identified] : b/l bunion and hammer toe deformities 2-5, dorsal subluxation of the of the left 2nd toe at MTPJ with callus formation at sub met 2 and porokeratosis at left 3rd digit tuft (flexible 3rd hammer toe)\par  [FreeTextEntry1] : IPK lesions submetatarsal head 2 and distal digital tuft IPK lesions submetatarsal head 2 and distal digital tuft (left

## 2021-09-22 DIAGNOSIS — M77.42 METATARSALGIA, LEFT FOOT: ICD-10-CM

## 2021-09-22 DIAGNOSIS — Q82.9 CONGENITAL MALFORMATION OF SKIN, UNSPECIFIED: ICD-10-CM

## 2022-01-10 ENCOUNTER — APPOINTMENT (OUTPATIENT)
Dept: PODIATRY | Facility: CLINIC | Age: 62
End: 2022-01-10
Payer: MEDICAID

## 2022-01-10 ENCOUNTER — OUTPATIENT (OUTPATIENT)
Dept: OUTPATIENT SERVICES | Facility: HOSPITAL | Age: 62
LOS: 1 days | Discharge: HOME | End: 2022-01-10

## 2022-01-10 DIAGNOSIS — Z98.82 BREAST IMPLANT STATUS: Chronic | ICD-10-CM

## 2022-01-10 PROCEDURE — 99212 OFFICE O/P EST SF 10 MIN: CPT | Mod: NC

## 2022-01-11 NOTE — ASSESSMENT
[FreeTextEntry1] : Assessment:\par -Bunion B/L L>R\par -Hammertoes 2-5 B/L L>R\par -Dislocation left 2nd MTPJ\par \par Plan:\par -Aseptic debridement of porokeratotic tissue with #15 blade b/l feet \par -Pt. RTC as needed

## 2022-01-11 NOTE — PHYSICAL EXAM
[General Appearance - Alert] : alert [General Appearance - In No Acute Distress] : in no acute distress [Ankle Swelling Bilaterally] : bilaterally  [2+] : left foot dorsalis pedis 2+ [Sensation] : the sensory exam was normal to light touch and pinprick [Oriented To Time, Place, And Person] : oriented to person, place, and time [Impaired Insight] : insight and judgment were intact [Ankle Swelling (On Exam)] : not present [Varicose Veins Of Lower Extremities] : not present [] : not present [Delayed in the Right Toes] : capillary refills normal in right toes [Delayed in the Left Toes] : capillary refills normal in the left toes [de-identified] : B/l bunion and hammer toe deformities 2-5\par Dorsal subluxation of the of the left 2nd toe at MTPJ\par Callus formation at sub met 2 and porokeratosis at left 3rd digit tuft (flexible 3rd hammertoe) [FreeTextEntry1] : Left foot IPK lesions submetatarsal head 2 and distal digital tuft \par

## 2022-01-11 NOTE — HISTORY OF PRESENT ILLNESS
[Sneakers] : marko [FreeTextEntry1] : CC: "Bunion and calluses"\par HPI:\par -62F c/o painful left foot 2/2 calluses \par -Patient h/o a bilateral bunion and hammer toe deformity with dorsal subluxation of the left 2nd digit at the MPTJ \par -Patient today for debridement of painful calluses Left foot. \par -Pt. denies any other pedal complaints at this time.

## 2022-01-11 NOTE — END OF VISIT
[Resident] : Resident [FreeTextEntry3] : . [FreeTextEntry2] : porokeratosis debrided\par return 3 month

## 2022-01-13 DIAGNOSIS — M77.42 METATARSALGIA, LEFT FOOT: ICD-10-CM

## 2022-01-13 DIAGNOSIS — M21.612 BUNION OF LEFT FOOT: ICD-10-CM

## 2022-01-13 DIAGNOSIS — Q82.9 CONGENITAL MALFORMATION OF SKIN, UNSPECIFIED: ICD-10-CM

## 2022-01-13 DIAGNOSIS — M20.42 OTHER HAMMER TOE(S) (ACQUIRED), LEFT FOOT: ICD-10-CM

## 2022-04-27 ENCOUNTER — OUTPATIENT (OUTPATIENT)
Dept: OUTPATIENT SERVICES | Facility: HOSPITAL | Age: 62
LOS: 1 days | Discharge: HOME | End: 2022-04-27

## 2022-04-27 ENCOUNTER — APPOINTMENT (OUTPATIENT)
Dept: PODIATRY | Facility: CLINIC | Age: 62
End: 2022-04-27
Payer: MEDICAID

## 2022-04-27 VITALS
HEART RATE: 71 BPM | DIASTOLIC BLOOD PRESSURE: 81 MMHG | HEIGHT: 68 IN | OXYGEN SATURATION: 96 % | SYSTOLIC BLOOD PRESSURE: 129 MMHG

## 2022-04-27 DIAGNOSIS — Z98.82 BREAST IMPLANT STATUS: Chronic | ICD-10-CM

## 2022-04-27 PROCEDURE — 99212 OFFICE O/P EST SF 10 MIN: CPT | Mod: NC

## 2022-04-27 NOTE — HISTORY OF PRESENT ILLNESS
[Sneakers] : marko [FreeTextEntry1] : 62 y.o female returns for continued care. Pain on plantar aspect of the left foot secondary to calluses. Patient h/o a bilateral bunion and hammer toe deformity with dorsal subluxation of the left 2nd digit at the MPTJ joint. Patient today for debridement of painful calluses Left foot. Pt. denies any other pedal complaints at this time.

## 2022-04-27 NOTE — PHYSICAL EXAM
[General Appearance - Alert] : alert [General Appearance - In No Acute Distress] : in no acute distress [Ankle Swelling Bilaterally] : bilaterally  [2+] : left foot dorsalis pedis 2+ [Sensation] : the sensory exam was normal to light touch and pinprick [Oriented To Time, Place, And Person] : oriented to person, place, and time [Impaired Insight] : insight and judgment were intact [Ankle Swelling (On Exam)] : not present [Varicose Veins Of Lower Extremities] : not present [] : not present [Delayed in the Right Toes] : capillary refills normal in right toes [Delayed in the Left Toes] : capillary refills normal in the left toes [de-identified] : B/l bunion and hammer toe deformities 2-5\par Dorsal subluxation of the of the left 2nd toe at MTPJ\par Callus formation at sub met 2 and porokeratosis at left 3rd digit tuft (flexible 3rd hammertoe) [FreeTextEntry1] : Left foot IPK lesions submetatarsal head 2 and distal digital tuft \par

## 2022-04-29 DIAGNOSIS — M20.42 OTHER HAMMER TOE(S) (ACQUIRED), LEFT FOOT: ICD-10-CM

## 2022-04-29 DIAGNOSIS — M77.42 METATARSALGIA, LEFT FOOT: ICD-10-CM

## 2022-04-29 DIAGNOSIS — M21.612 BUNION OF LEFT FOOT: ICD-10-CM

## 2022-04-29 DIAGNOSIS — Q82.9 CONGENITAL MALFORMATION OF SKIN, UNSPECIFIED: ICD-10-CM

## 2022-07-21 ENCOUNTER — APPOINTMENT (OUTPATIENT)
Dept: PODIATRY | Facility: CLINIC | Age: 62
End: 2022-07-21

## 2022-10-13 ENCOUNTER — OUTPATIENT (OUTPATIENT)
Dept: OUTPATIENT SERVICES | Facility: HOSPITAL | Age: 62
LOS: 1 days | Discharge: HOME | End: 2022-10-13

## 2022-10-13 ENCOUNTER — APPOINTMENT (OUTPATIENT)
Dept: PODIATRY | Facility: CLINIC | Age: 62
End: 2022-10-13

## 2022-10-13 DIAGNOSIS — B35.3 TINEA PEDIS: ICD-10-CM

## 2022-10-13 DIAGNOSIS — Z98.82 BREAST IMPLANT STATUS: Chronic | ICD-10-CM

## 2022-10-13 PROCEDURE — 99213 OFFICE O/P EST LOW 20 MIN: CPT | Mod: NC

## 2022-10-13 RX ORDER — NYSTATIN 100000 1/G
100000 POWDER TOPICAL 3 TIMES DAILY
Qty: 1 | Refills: 0 | Status: ACTIVE | COMMUNITY
Start: 2022-10-13 | End: 1900-01-01

## 2022-10-14 DIAGNOSIS — B35.3 TINEA PEDIS: ICD-10-CM

## 2022-10-14 DIAGNOSIS — M21.612 BUNION OF LEFT FOOT: ICD-10-CM

## 2022-10-14 DIAGNOSIS — M77.42 METATARSALGIA, LEFT FOOT: ICD-10-CM

## 2022-10-14 DIAGNOSIS — Q82.9 CONGENITAL MALFORMATION OF SKIN, UNSPECIFIED: ICD-10-CM

## 2022-10-14 DIAGNOSIS — M20.42 OTHER HAMMER TOE(S) (ACQUIRED), LEFT FOOT: ICD-10-CM

## 2022-10-14 NOTE — ASSESSMENT
[FreeTextEntry1] : Assessment:\par -Bunion B/L L>R\par -Hammertoes 2-5 B/L L>R\par -Dislocation left 2nd MTPJ\par \par Plan:\par -Aseptic debridement of porokeratotic tissue with #15 blade b/l feet \par -rx nystatin powder\par -Pt. RTC as needed

## 2022-10-14 NOTE — HISTORY OF PRESENT ILLNESS
[Sneakers] : marko [FreeTextEntry1] : 62 y.o female returns for continued care. Pain on plantar aspect of the left foot secondary to calluses. Patient h/o a bilateral bunion and hammer toe deformity with dorsal subluxation of the left 2nd digit at the MPTJ joint. secondary complaint of scaling and mild pruritus in webspaces

## 2022-10-14 NOTE — PHYSICAL EXAM
[General Appearance - Alert] : alert [General Appearance - In No Acute Distress] : in no acute distress [Ankle Swelling Bilaterally] : bilaterally  [2+] : left foot dorsalis pedis 2+ [Sensation] : the sensory exam was normal to light touch and pinprick [Oriented To Time, Place, And Person] : oriented to person, place, and time [Impaired Insight] : insight and judgment were intact [Ankle Swelling (On Exam)] : not present [Varicose Veins Of Lower Extremities] : not present [] : not present [Delayed in the Right Toes] : capillary refills normal in right toes [Delayed in the Left Toes] : capillary refills normal in the left toes [de-identified] : B/l bunion and hammer toe deformities 2-5\par Dorsal subluxation of the of the left 2nd toe at MTPJ\par Callus formation at sub met 2 and porokeratosis at left 3rd digit tuft (flexible 3rd hammertoe) [FreeTextEntry1] : Left foot IPK lesions submetatarsal head 2 and distal digital tuft \par scaling in webspaces b/l feet

## 2023-01-18 NOTE — PRE-ANESTHESIA EVALUATION ADULT - NSANTHAPLANRD_GEN_ALL_CORE
OFFICE VISIT      Patient: Haris Chilel neck supple. No rigidity or tenderness.      Right lower leg: No edema.      Left lower leg: No edema.      Comments: Soft tissue edema/discoloration of 3x4 inches behind right thigh  No calf edematous/tenderness  No tenderness with dorsal flexion of right foot   Lymphadenopathy:      Cervical: No cervical adenopathy.   Skin:     General: Skin is warm.      Coloration: Skin is not jaundiced or pale.      Findings: Bruising present. No erythema or rash.   Neurological:      Mental Status: She is alert and oriented to person, place, and time.      Cranial Nerves: No cranial nerve deficit.      Motor: No abnormal muscle tone.      Coordination: Coordination normal.      Gait: Gait abnormal.   Psychiatric:         Mood and Affect: Mood and affect normal.         Behavior: Behavior normal.         Visit Vitals  BP (!) 138/92 (BP Location: RUE - Right upper extremity, Patient Position: Sitting, Cuff Size: Regular)   Pulse 75   Temp 98 °F (36.7 °C) (Tympanic)   Resp 16   Ht 5' 9\"   Wt 90.1 kg (198 lb 8.4 oz)   LMP 02/09/2022 (Exact Date)   SpO2 100%   BMI 29.32 kg/m²       DIAGNOSTIC STUDIES:   LAB RESULTS:    No results found for this visit on 01/18/23.    Assessment AND PLAN:   This is a 44 year old year-old female who presents with   .    1. Pain      Please take medications as directed.  Supportive care and monitoring, and Follow up with your PCp or here in 2 weeks or sooner if worse and  Go to ER if it is needed      Instructions provided as documented in the AVS.          The patient indicated understanding of the diagnosis and agreed with the plan of care.       monitored anesthesia care (MAC)

## 2023-03-09 ENCOUNTER — OUTPATIENT (OUTPATIENT)
Dept: OUTPATIENT SERVICES | Facility: HOSPITAL | Age: 63
LOS: 1 days | End: 2023-03-09
Payer: MEDICAID

## 2023-03-09 ENCOUNTER — APPOINTMENT (OUTPATIENT)
Dept: PODIATRY | Facility: CLINIC | Age: 63
End: 2023-03-09
Payer: MEDICAID

## 2023-03-09 DIAGNOSIS — Z98.82 BREAST IMPLANT STATUS: Chronic | ICD-10-CM

## 2023-03-09 DIAGNOSIS — Z00.00 ENCOUNTER FOR GENERAL ADULT MEDICAL EXAMINATION WITHOUT ABNORMAL FINDINGS: ICD-10-CM

## 2023-03-09 PROCEDURE — 11721 DEBRIDE NAIL 6 OR MORE: CPT | Mod: NC

## 2023-03-09 PROCEDURE — 11721 DEBRIDE NAIL 6 OR MORE: CPT

## 2023-03-11 NOTE — HISTORY OF PRESENT ILLNESS
[Sneakers] : marko [FreeTextEntry1] : 63 y.o female returns for continued care. Pain on plantar aspect of the left foot secondary to calluses. Patient h/o a bilateral bunion and hammer toe deformity with dorsal subluxation of the left 2nd digit at the MPTJ joint. secondary complaint of scaling and mild pruritus in webspaces

## 2023-03-11 NOTE — PHYSICAL EXAM
[General Appearance - Alert] : alert [General Appearance - In No Acute Distress] : in no acute distress [Ankle Swelling Bilaterally] : bilaterally  [2+] : right foot dorsalis pedis 2+ [Sensation] : the sensory exam was normal to light touch and pinprick [Oriented To Time, Place, And Person] : oriented to person, place, and time [Impaired Insight] : insight and judgment were intact [Ankle Swelling (On Exam)] : not present [Varicose Veins Of Lower Extremities] : not present [Delayed in the Right Toes] : capillary refills normal in right toes [] : not present [Delayed in the Left Toes] : capillary refills normal in the left toes [de-identified] : B/l bunion and hammer toe deformities 2-5\par Dorsal subluxation of the of the left 2nd toe at MTPJ\par Callus formation at sub met 2 and porokeratosis at left 3rd digit tuft (flexible 3rd hammertoe) [FreeTextEntry1] : Left foot IPK lesions submetatarsal head 2 and distal digital tuft \par scaling in webspaces b/l feet

## 2023-03-14 DIAGNOSIS — Q82.9 CONGENITAL MALFORMATION OF SKIN, UNSPECIFIED: ICD-10-CM

## 2023-03-14 DIAGNOSIS — M21.612 BUNION OF LEFT FOOT: ICD-10-CM

## 2023-03-14 DIAGNOSIS — M20.42 OTHER HAMMER TOE(S) (ACQUIRED), LEFT FOOT: ICD-10-CM

## 2023-03-14 DIAGNOSIS — M77.42 METATARSALGIA, LEFT FOOT: ICD-10-CM

## 2023-06-14 ENCOUNTER — APPOINTMENT (OUTPATIENT)
Dept: PODIATRY | Facility: CLINIC | Age: 63
End: 2023-06-14
Payer: MEDICAID

## 2023-06-14 ENCOUNTER — OUTPATIENT (OUTPATIENT)
Dept: OUTPATIENT SERVICES | Facility: HOSPITAL | Age: 63
LOS: 1 days | End: 2023-06-14
Payer: MEDICAID

## 2023-06-14 DIAGNOSIS — Z98.82 BREAST IMPLANT STATUS: Chronic | ICD-10-CM

## 2023-06-14 DIAGNOSIS — Z00.00 ENCOUNTER FOR GENERAL ADULT MEDICAL EXAMINATION WITHOUT ABNORMAL FINDINGS: ICD-10-CM

## 2023-06-14 PROCEDURE — 99212 OFFICE O/P EST SF 10 MIN: CPT

## 2023-06-14 PROCEDURE — 99212 OFFICE O/P EST SF 10 MIN: CPT | Mod: NC,GC

## 2023-06-14 NOTE — PHYSICAL EXAM
[General Appearance - Alert] : alert [General Appearance - In No Acute Distress] : in no acute distress [Ankle Swelling Bilaterally] : bilaterally  [2+] : left foot dorsalis pedis 2+ [Sensation] : the sensory exam was normal to light touch and pinprick [Oriented To Time, Place, And Person] : oriented to person, place, and time [Impaired Insight] : insight and judgment were intact [Ankle Swelling (On Exam)] : not present [Varicose Veins Of Lower Extremities] : not present [] : not present [Delayed in the Right Toes] : capillary refills normal in right toes [Delayed in the Left Toes] : capillary refills normal in the left toes [de-identified] : B/l bunion and hammer toe deformities 2-5\par Dorsal subluxation of the of the left 2nd toe at MTPJ\par Callus formation at sub met 2 and porokeratosis at left 3rd digit tuft (flexible 3rd hammertoe) [FreeTextEntry1] : Left foot IPK lesions submetatarsal head 2 and distal digital tuft \par scaling in webspaces b/l feet

## 2023-06-14 NOTE — END OF VISIT
[] : Resident [FreeTextEntry3] : porokeratosis debrided\par follow up with vivi for fabrication of inserts\par

## 2023-06-15 DIAGNOSIS — M77.42 METATARSALGIA, LEFT FOOT: ICD-10-CM

## 2023-06-15 DIAGNOSIS — Q82.9 CONGENITAL MALFORMATION OF SKIN, UNSPECIFIED: ICD-10-CM

## 2023-06-15 DIAGNOSIS — M20.42 OTHER HAMMER TOE(S) (ACQUIRED), LEFT FOOT: ICD-10-CM

## 2023-07-13 ENCOUNTER — APPOINTMENT (OUTPATIENT)
Dept: PODIATRY | Facility: CLINIC | Age: 63
End: 2023-07-13

## 2024-01-04 ENCOUNTER — APPOINTMENT (OUTPATIENT)
Dept: PODIATRY | Facility: CLINIC | Age: 64
End: 2024-01-04
Payer: MEDICAID

## 2024-01-04 ENCOUNTER — OUTPATIENT (OUTPATIENT)
Dept: OUTPATIENT SERVICES | Facility: HOSPITAL | Age: 64
LOS: 1 days | End: 2024-01-04
Payer: MEDICAID

## 2024-01-04 DIAGNOSIS — Z00.00 ENCOUNTER FOR GENERAL ADULT MEDICAL EXAMINATION WITHOUT ABNORMAL FINDINGS: ICD-10-CM

## 2024-01-04 DIAGNOSIS — Z98.82 BREAST IMPLANT STATUS: Chronic | ICD-10-CM

## 2024-01-04 DIAGNOSIS — M77.42 METATARSALGIA, LEFT FOOT: ICD-10-CM

## 2024-01-04 PROCEDURE — 99212 OFFICE O/P EST SF 10 MIN: CPT | Mod: NC

## 2024-01-04 PROCEDURE — 99212 OFFICE O/P EST SF 10 MIN: CPT

## 2024-01-04 RX ORDER — UREA 40 G/100G
40 CREAM TOPICAL DAILY
Qty: 1 | Refills: 0 | Status: ACTIVE | COMMUNITY
Start: 2024-01-04 | End: 1900-01-01

## 2024-01-06 PROBLEM — M77.42 METATARSALGIA OF LEFT FOOT: Status: ACTIVE | Noted: 2019-10-22

## 2024-01-06 NOTE — HISTORY OF PRESENT ILLNESS
[Sneakers] : marko [FreeTextEntry1] : 63 y.o female returns for continued care. Pain on plantar aspect of the left foot secondary to calluses. Patient h/o a bilateral bunion and hammer toe deformity with dorsal subluxation of the left 2nd digit at the MPTJ joint. secondary complaint of scaling and mild pruritus in webspaces   1/4/24: Patient returns to clinic for routine callus care. Patient denies any other pedal complaints at this time.

## 2024-01-06 NOTE — ASSESSMENT
[FreeTextEntry1] : Assessment: -Bunion B/L L>R -Hammertoes 2-5 B/L L>R -Dislocation left 2nd MTPJ  Plan: -Patient seen and evaluated in clinic today with all questions and concerns addressed and answered.  -Aseptic debridement of porokeratotic tissue with #15 blade b/l feet, WOI. -Rx urea cream to be applied to calluses daily. -Patient return to clinic in 3 months.

## 2024-01-06 NOTE — PHYSICAL EXAM
[General Appearance - Alert] : alert [General Appearance - In No Acute Distress] : in no acute distress [Ankle Swelling Bilaterally] : bilaterally  [2+] : left foot dorsalis pedis 2+ [Sensation] : the sensory exam was normal to light touch and pinprick [Oriented To Time, Place, And Person] : oriented to person, place, and time [Impaired Insight] : insight and judgment were intact [Ankle Swelling (On Exam)] : not present [Varicose Veins Of Lower Extremities] : not present [] : not present [Delayed in the Right Toes] : capillary refills normal in right toes [Delayed in the Left Toes] : capillary refills normal in the left toes [de-identified] : B/l bunion and hammer toe deformities 2-5 Dorsal subluxation of the of the left 2nd toe at MTPJ Callus formation at sub met 2, ipj hallux (LEWIS) and porokeratosis at left 3rd digit tuft (flexible 3rd hammertoe) [FreeTextEntry1] : Left foot IPK lesions submetatarsal head 2 and distal digital tuft \par  scaling in webspaces b/l feet

## 2024-01-08 DIAGNOSIS — Q82.9 CONGENITAL MALFORMATION OF SKIN, UNSPECIFIED: ICD-10-CM
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
## 2024-01-08 DIAGNOSIS — M77.42 METATARSALGIA, LEFT FOOT: ICD-10-CM

## 2024-04-04 ENCOUNTER — APPOINTMENT (OUTPATIENT)
Dept: PODIATRY | Facility: CLINIC | Age: 64
End: 2024-04-04

## 2024-04-05 ENCOUNTER — APPOINTMENT (OUTPATIENT)
Dept: PODIATRY | Facility: CLINIC | Age: 64
End: 2024-04-05
Payer: MEDICARE

## 2024-04-05 ENCOUNTER — APPOINTMENT (OUTPATIENT)
Dept: PODIATRY | Facility: CLINIC | Age: 64
End: 2024-04-05

## 2024-04-05 ENCOUNTER — OUTPATIENT (OUTPATIENT)
Dept: OUTPATIENT SERVICES | Facility: HOSPITAL | Age: 64
LOS: 1 days | End: 2024-04-05
Payer: MEDICARE

## 2024-04-05 DIAGNOSIS — M20.42 OTHER HAMMER TOE(S) (ACQUIRED), LEFT FOOT: ICD-10-CM

## 2024-04-05 DIAGNOSIS — M79.672 PAIN IN LEFT FOOT: ICD-10-CM

## 2024-04-05 DIAGNOSIS — Z00.00 ENCOUNTER FOR GENERAL ADULT MEDICAL EXAMINATION WITHOUT ABNORMAL FINDINGS: ICD-10-CM

## 2024-04-05 DIAGNOSIS — Z98.82 BREAST IMPLANT STATUS: Chronic | ICD-10-CM

## 2024-04-05 DIAGNOSIS — M21.612 BUNION OF LEFT FOOT: ICD-10-CM

## 2024-04-05 DIAGNOSIS — Q82.8 OTHER SPECIFIED CONGENITAL MALFORMATIONS OF SKIN: ICD-10-CM

## 2024-04-05 PROCEDURE — 99213 OFFICE O/P EST LOW 20 MIN: CPT

## 2024-04-05 PROCEDURE — 99213 OFFICE O/P EST LOW 20 MIN: CPT | Mod: GC

## 2024-04-05 NOTE — END OF VISIT
[] : Resident [FreeTextEntry3] : porokeratosis debrided pt wishes to have surgery on the left bunion and hammer toe.  -referred for xrays pt has a chronic smoking history.. referred to vascular for evaluation prior to surgical booking

## 2024-04-05 NOTE — HISTORY OF PRESENT ILLNESS
[Sneakers] : marko [FreeTextEntry1] : 63 y.o female returns for continued care. Pain on plantar aspect of the left foot secondary to calluses. Patient h/o a bilateral bunion and hammer toe deformity with dorsal subluxation of the left 2nd digit at the MPTJ joint. secondary complaint of scaling and mild pruritus in webspaces   1/4/24: Patient returns to clinic for routine callus care. Patient denies any other pedal complaints at this time.   4/5/24: Patient returns to clinic for routine callus care. Patient denies any other pedal complaints at this time. Would like to discuss surgical intervention for bunion and hammer toe left foot

## 2024-04-05 NOTE — PHYSICAL EXAM
[General Appearance - Alert] : alert [General Appearance - In No Acute Distress] : in no acute distress [Ankle Swelling Bilaterally] : bilaterally  [2+] : left foot dorsalis pedis 2+ [Sensation] : the sensory exam was normal to light touch and pinprick [Oriented To Time, Place, And Person] : oriented to person, place, and time [Impaired Insight] : insight and judgment were intact [Ankle Swelling (On Exam)] : not present [Varicose Veins Of Lower Extremities] : not present [] : not present [Delayed in the Right Toes] : capillary refills normal in right toes [Delayed in the Left Toes] : capillary refills normal in the left toes [de-identified] : B/l bunion and hammer toe deformities 2-5 Dorsal subluxation of the of the left 2nd toe at MTPJ Callus formation at sub met 2, ipj hallux (LEWIS) and porokeratosis at left 3rd digit tuft (flexible 3rd hammertoe) [FreeTextEntry1] : Left foot IPK lesions submetatarsal head 2 and distal digital tuft  scaling in webspaces b/l feet

## 2024-04-05 NOTE — ASSESSMENT
[Verbal] : verbal [Patient] : patient [FreeTextEntry1] : Assessment: -Bunion B/L L>R -Hammertoes 2-5 B/L L>R -Dislocation left 2nd MTPJ  Plan: -Patient seen and evaluated in clinic today with all questions and concerns addressed and answered.  -Aseptic debridement of porokeratotic tissue with #15 blade b/l feet, WOI. - Patient said she is ready to take time off work to have surgery on her left foot bunion and 2nd hammer toe - Prior to Sx we discussed with patient the need for a vascular work up due to extensive hx of smoking and need for WB XR LF -Rx urea cream to be applied to calluses daily. -Patient return to clinic in 3 months after vascular f/u and XR to discuss surgical plan

## 2024-04-18 ENCOUNTER — APPOINTMENT (OUTPATIENT)
Dept: DERMATOLOGY | Facility: CLINIC | Age: 64
End: 2024-04-18

## 2024-04-19 DIAGNOSIS — M79.672 PAIN IN LEFT FOOT: ICD-10-CM

## 2024-04-19 DIAGNOSIS — M20.42 OTHER HAMMER TOE(S) (ACQUIRED), LEFT FOOT: ICD-10-CM

## 2024-04-19 DIAGNOSIS — M21.612 BUNION OF LEFT FOOT: ICD-10-CM
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
## 2024-04-19 DIAGNOSIS — Q82.8 OTHER SPECIFIED CONGENITAL MALFORMATIONS OF SKIN: ICD-10-CM

## 2024-05-30 ENCOUNTER — APPOINTMENT (OUTPATIENT)
Dept: VASCULAR SURGERY | Facility: CLINIC | Age: 64
End: 2024-05-30
Payer: MEDICARE

## 2024-05-30 VITALS
WEIGHT: 166 LBS | SYSTOLIC BLOOD PRESSURE: 104 MMHG | DIASTOLIC BLOOD PRESSURE: 85 MMHG | OXYGEN SATURATION: 96 % | HEART RATE: 85 BPM | BODY MASS INDEX: 25.16 KG/M2 | HEIGHT: 68 IN

## 2024-05-30 PROCEDURE — 99203 OFFICE O/P NEW LOW 30 MIN: CPT

## 2024-05-30 PROCEDURE — 93925 LOWER EXTREMITY STUDY: CPT

## 2024-05-30 NOTE — HISTORY OF PRESENT ILLNESS
[FreeTextEntry1] : Patient is a 64-year-old female who is going to undergo Hammer toe surgery by podiatry. No history of CAD, HTN, DM or HLP, but she is smoker for life. History of breast implants.  No rest pain or claudication. She is here for preop clearance.

## 2024-05-30 NOTE — REASON FOR VISIT
[Consultation] : a consultation visit [FreeTextEntry1] : Evaluation for PAD prior to podiatric procedure.

## 2024-05-30 NOTE — PHYSICAL EXAM
[Normal Breath Sounds] : Normal breath sounds [Normal Heart Sounds] : normal heart sounds [2+] : left 2+ [Varicose Veins Of Lower Extremities] : bilaterally [Ankle Swelling On The Right] : mild [No HSM] : no hepatosplenomegaly [No Rash or Lesion] : No rash or lesion [Alert] : alert [Oriented to Person] : oriented to person [Oriented to Place] : oriented to place [Oriented to Time] : oriented to time [JVD] : no jugular venous distention  [Ankle Swelling (On Exam)] : not present [] : not present [FreeTextEntry1] : Telangiectasia and spider veins in B/L legs- no large varicosities.

## 2024-05-30 NOTE — DATA REVIEWED
[FreeTextEntry1] : B/L LE arterial duplex was performed which showed no significant atherosclerosis. Normal flow is visualized in all arteries.

## 2024-05-30 NOTE — ASSESSMENT
[FreeTextEntry1] : Patient is a 64-year-old female who is going to undergo Hammer toe surgery by podiatry. No history of CAD, HTN, DM or HLP, but she is smoker for life. History of breast implants. No rest pain or claudication. She is here for preop clearance. Pulses are strongly palpable. Telangiectasia and spider veins in B/L legs- no large varicosities.   B/L LE arterial duplex was performed which showed no significant atherosclerosis. Normal flow is visualized in all arteries.  No need for vascular intervention at this time. She is cleared to undergo podiatry procedure from vascular surgery standpoint. Counseled her re smoking cessation.

## 2024-06-13 ENCOUNTER — APPOINTMENT (OUTPATIENT)
Dept: DERMATOLOGY | Facility: CLINIC | Age: 64
End: 2024-06-13

## 2024-12-11 NOTE — H&P PST ADULT - CONSTITUTIONAL
Well-developed, well nourished Generalized weakness, fatigue and confusional state since Sunday, on Augmentin for cystitis

## 2025-01-17 ENCOUNTER — OUTPATIENT (OUTPATIENT)
Dept: OUTPATIENT SERVICES | Facility: HOSPITAL | Age: 65
LOS: 1 days | End: 2025-01-17
Payer: MEDICARE

## 2025-01-17 DIAGNOSIS — Z00.8 ENCOUNTER FOR OTHER GENERAL EXAMINATION: ICD-10-CM

## 2025-01-17 DIAGNOSIS — L72.11 PILAR CYST: ICD-10-CM

## 2025-01-17 DIAGNOSIS — Z98.82 BREAST IMPLANT STATUS: Chronic | ICD-10-CM

## 2025-01-17 PROCEDURE — 76536 US EXAM OF HEAD AND NECK: CPT

## 2025-01-17 PROCEDURE — 76536 US EXAM OF HEAD AND NECK: CPT | Mod: 26

## 2025-01-18 DIAGNOSIS — L72.11 PILAR CYST: ICD-10-CM

## 2025-01-21 ENCOUNTER — APPOINTMENT (OUTPATIENT)
Dept: GASTROENTEROLOGY | Facility: CLINIC | Age: 65
End: 2025-01-21

## 2025-02-28 ENCOUNTER — APPOINTMENT (OUTPATIENT)
Dept: GASTROENTEROLOGY | Facility: CLINIC | Age: 65
End: 2025-02-28